# Patient Record
Sex: MALE | URBAN - METROPOLITAN AREA
[De-identification: names, ages, dates, MRNs, and addresses within clinical notes are randomized per-mention and may not be internally consistent; named-entity substitution may affect disease eponyms.]

---

## 2023-11-06 ENCOUNTER — OFFICE VISIT (OUTPATIENT)
Dept: URBAN - METROPOLITAN AREA CLINIC 86 | Facility: CLINIC | Age: 18
End: 2023-11-06
Payer: COMMERCIAL

## 2023-11-06 ENCOUNTER — LAB OUTSIDE AN ENCOUNTER (OUTPATIENT)
Dept: URBAN - METROPOLITAN AREA CLINIC 86 | Facility: CLINIC | Age: 18
End: 2023-11-06

## 2023-11-06 VITALS
SYSTOLIC BLOOD PRESSURE: 115 MMHG | DIASTOLIC BLOOD PRESSURE: 73 MMHG | BODY MASS INDEX: 23.34 KG/M2 | HEART RATE: 67 BPM | WEIGHT: 163 LBS | TEMPERATURE: 96.8 F | HEIGHT: 70 IN

## 2023-11-06 DIAGNOSIS — R74.8 ELEVATED LIVER ENZYMES: ICD-10-CM

## 2023-11-06 DIAGNOSIS — Z71.89 VACCINE COUNSELING: ICD-10-CM

## 2023-11-06 PROCEDURE — 99204 OFFICE O/P NEW MOD 45 MIN: CPT | Performed by: PHYSICIAN ASSISTANT

## 2023-11-06 NOTE — PHYSICAL EXAM HENT:
Head,  normocephalic,  atraumatic,  Face,  Face within normal limits,  Ears,  External ears within normal limits,  Nose/Nasopharynx, pt wearing mask

## 2023-11-06 NOTE — HPI-TODAY'S VISIT:
This is 19 yo male here for abnormal liver enzymes. Referred by  DR. Vargas. A copy of the note will be sent to the referring provider.   Abnormal liver enzymes were noted  on 10/27/23 and cr 0.93, na 140, k 4.6, tb 0.6, alp 70, ast 97, alt 187 cbc with wbc 4.3, rbc 5.0, hgb 14, mcv 90 platelets 171 ldl 110 HIV negative.  g/c/trich negative asked about vitamins and not taking pre work out called ford has nacin 20 mg, black pepper extract, coffe arabic, bitter organce extrace , cognizin, snthernergy tm.  He denies nsaids, recent illness, other supplements. denies seammoss.  Does have tumeric in diet but not new and will reduce or stop for time being.  did not do cpx labs last year so nothing to compe to  Imaging done none and will order.

## 2023-11-09 ENCOUNTER — TELEPHONE ENCOUNTER (OUTPATIENT)
Dept: URBAN - METROPOLITAN AREA CLINIC 86 | Facility: CLINIC | Age: 18
End: 2023-11-09

## 2023-11-11 LAB
A/G RATIO: 2.1
ACTIN (SMOOTH MUSCLE) ANTIBODY: 12
ALBUMIN: 5
ALKALINE PHOSPHATASE: 69
ALPHA-1-ANTITRYPSIN, SERUM: 133
ALT (SGPT): 191
ANA DIRECT: NEGATIVE
AST (SGOT): 92
BASO (ABSOLUTE): 0
BASOS: 1
BILIRUBIN, TOTAL: 0.7
BUN/CREATININE RATIO: 14
BUN: 12
CALCIUM: 9.9
CARBON DIOXIDE, TOTAL: 25
CERULOPLASMIN: 18.1
CHLORIDE: 102
CREATININE: 0.88
EGFR: 126
EOS (ABSOLUTE): 0.1
EOS: 1
FERRITIN, SERUM: 176
GLOBULIN, TOTAL: 2.4
GLUCOSE: 77
HBSAG SCREEN: NEGATIVE
HEMATOCRIT: 45.9
HEMATOLOGY COMMENTS:: (no result)
HEMOGLOBIN: 15.7
HEP A AB, TOTAL: POSITIVE
HEP B CORE AB, TOT: NEGATIVE
HEPATITIS B SURF AB QUANT: <3.1
IMMATURE CELLS: (no result)
IMMATURE GRANS (ABS): 0
IMMATURE GRANULOCYTES: 0
IMMUNOGLOBULIN A, QN, SERUM: 249
IMMUNOGLOBULIN E, TOTAL: 3
IMMUNOGLOBULIN G, QN, SERUM: 1142
IMMUNOGLOBULIN M, QN, SERUM: 104
IRON BIND.CAP.(TIBC): 356
IRON SATURATION: 51
IRON: 182
LYMPHS (ABSOLUTE): 2.5
LYMPHS: 49
MCH: 30.8
MCHC: 34.2
MCV: 90
MITOCHONDRIAL (M2) ANTIBODY: <20
MONOCYTES(ABSOLUTE): 0.4
MONOCYTES: 8
NEUTROPHILS (ABSOLUTE): 2.1
NEUTROPHILS: 41
NRBC: (no result)
PHENOTYPE (PI): (no result)
PLATELETS: 189
POTASSIUM: 4.5
PROTEIN, TOTAL: 7.4
RBC: 5.09
RDW: 13.1
SODIUM: 140
UIBC: 174
WBC: 5.2

## 2023-11-13 ENCOUNTER — LAB OUTSIDE AN ENCOUNTER (OUTPATIENT)
Dept: URBAN - METROPOLITAN AREA CLINIC 86 | Facility: CLINIC | Age: 18
End: 2023-11-13

## 2023-11-13 ENCOUNTER — TELEPHONE ENCOUNTER (OUTPATIENT)
Dept: URBAN - METROPOLITAN AREA CLINIC 86 | Facility: CLINIC | Age: 18
End: 2023-11-13

## 2023-11-14 LAB
ALBUMIN: 4.7
ALKALINE PHOSPHATASE: 70
ALT (SGPT): 127
AST (SGOT): 62
BILIRUBIN, DIRECT: 0.21
BILIRUBIN, TOTAL: 0.9
PROTEIN, TOTAL: 7.3

## 2023-12-15 ENCOUNTER — OFFICE VISIT (OUTPATIENT)
Dept: URBAN - METROPOLITAN AREA CLINIC 91 | Facility: CLINIC | Age: 18
End: 2023-12-15

## 2023-12-15 ENCOUNTER — TELEPHONE ENCOUNTER (OUTPATIENT)
Dept: URBAN - METROPOLITAN AREA MEDICAL CENTER 28 | Facility: MEDICAL CENTER | Age: 18
End: 2023-12-15

## 2023-12-29 ENCOUNTER — OFFICE VISIT (OUTPATIENT)
Dept: URBAN - METROPOLITAN AREA CLINIC 91 | Facility: CLINIC | Age: 18
End: 2023-12-29
Payer: COMMERCIAL

## 2023-12-29 DIAGNOSIS — R74.8 ELEVATED LIVER ENZYMES: ICD-10-CM

## 2023-12-29 PROCEDURE — 76705 ECHO EXAM OF ABDOMEN: CPT

## 2023-12-29 PROCEDURE — 93975 VASCULAR STUDY: CPT

## 2024-01-04 ENCOUNTER — TELEPHONE ENCOUNTER (OUTPATIENT)
Dept: URBAN - METROPOLITAN AREA CLINIC 86 | Facility: CLINIC | Age: 19
End: 2024-01-04

## 2024-01-04 NOTE — HPI-FREE TEXT
Dear Manas Acosta,  The 12/29/23 ultrasound was sent to me. The liver echogenicity appears normal. They did not see any lesions. The common duct is 4 mm and this is normal. Gallbladder normal. Pancreas normal were seen. The hepatic vasculature is patent. Spleen 9.9 cm and this is normal. The right kidney appears normal. Overall they thought this is a normal study which is good to note. If you recall, we were checking this given your elevated liver enzymes. At the office visit we discussed stopping the preworkout and following the labs. The labs were improving off the preworkout and I will have the office contact you to get some more labs done before the appointment.  Hanna Andrew PA-C

## 2024-01-09 ENCOUNTER — TELEPHONE ENCOUNTER (OUTPATIENT)
Dept: URBAN - METROPOLITAN AREA CLINIC 86 | Facility: CLINIC | Age: 19
End: 2024-01-09

## 2024-01-09 LAB
ALBUMIN: 4.6
ALKALINE PHOSPHATASE: 75
ALT (SGPT): 61
AST (SGOT): 41
BILIRUBIN, DIRECT: 0.16
BILIRUBIN, TOTAL: 0.7
PROTEIN, TOTAL: 6.8

## 2024-01-09 NOTE — HPI-TODAY'S VISIT:
Dear Manas Acosta,   The 1/8/24 labs were sent to me. The bilirubin total 0.7, alkaline phosphatase 75, ast 41, alt 61. Improving still as previously ast 62, alt 127. Happy to see this. Will review again at the follow up. Sima Andrew PA-C

## 2024-01-11 ENCOUNTER — DASHBOARD ENCOUNTERS (OUTPATIENT)
Age: 19
End: 2024-01-11

## 2024-01-11 ENCOUNTER — OFFICE VISIT (OUTPATIENT)
Dept: URBAN - METROPOLITAN AREA CLINIC 86 | Facility: CLINIC | Age: 19
End: 2024-01-11
Payer: COMMERCIAL

## 2024-01-11 VITALS
WEIGHT: 162 LBS | SYSTOLIC BLOOD PRESSURE: 96 MMHG | HEART RATE: 68 BPM | TEMPERATURE: 97.7 F | BODY MASS INDEX: 23.19 KG/M2 | DIASTOLIC BLOOD PRESSURE: 62 MMHG | HEIGHT: 70 IN

## 2024-01-11 DIAGNOSIS — R74.8 ELEVATED LIVER ENZYMES: ICD-10-CM

## 2024-01-11 DIAGNOSIS — Z71.89 VACCINE COUNSELING: ICD-10-CM

## 2024-01-11 PROCEDURE — 99214 OFFICE O/P EST MOD 30 MIN: CPT | Performed by: PHYSICIAN ASSISTANT

## 2024-01-11 NOTE — HPI-TODAY'S VISIT:
This is 19 yo male here for abnormal liver enzymes. Referred by  DR. Vargas. A copy of the note will be sent to the referring provider.    1/11/24 office visit   1/8/24 labs were sent to me. The bilirubin total 0.7, alkaline phosphatase 75, ast 41, alt 61. Improving still as previously ast 62, alt 127. Happy to see this. Will review again at the follow up.   The 12/29/23 ultrasound was sent to me. The liver echogenicity appears normal. They did not see any lesions. The common duct is 4 mm and this is normal. Gallbladder normal. Pancreas normal were seen. The hepatic vasculature is patent. Spleen 9.9 cm and this is normal. The right kidney appears normal. Overall they thought this is a normal study which is good to note. If you recall, we were checking this given your elevated liver enzymes. At the office visit we discussed stopping the preworkout and following the labs. The labs were improving off the preworkout and I will have the office contact you to get some more labs done before the appointment.  Hanna Andrew PA-C  The 11/6/23 labs were sent to me. The immunoglobulin testing was normal other than that the IgA was 3 and this is slightly but I would just recommend sharing that with her primary care. Does not appear there is an immune issue going on with the liver. Iron binding capacity 356 and this is normal, iron slightly elevated at 182 however limits of normal 169. 51% saturation this is normal. Ceruloplasmin normal. Ferritin 176 and this is normal. You are not immune to hepatitis B and I recommend discussing that vaccine with your primary care provider. ASMA negative, AMA negative. Alpha 1 antitrypsin level 133 and the phenotype is MM and this is normal. The creatinine 0.88, sodium 140, potassium 4.5, bilirubin 0.7, alkaline phosphatase 69, AST 92, . FREDDIE negative as well. Given that the ASMA, AMA, and FREDDIE negative I do not feel like there is an autoimmune issue at play here. The hemoglobin 15.7, MCV 90, platelets 189. There is no evidence of hepatitis B. You have immunity to hepatitis A. As discussed really need to work on the diet and reduce any excess turmeric as well. These labs do not indicate that there is any autoimmune viral or genetic issues at play here. We will see what your ultrasound shows us as well as the labs ordered for 11/13/23.   ,  Disucssed likely the preoworkout and tumeric combo could have set this off need to follow until normal.   recap Abnormal liver enzymes were noted  on 10/27/23 and cr 0.93, na 140, k 4.6, tb 0.6, alp 70, ast 97, alt 187 cbc with wbc 4.3, rbc 5.0, hgb 14, mcv 90 platelets 171 ldl 110 HIV negative.  g/c/trich negative asked about vitamins and not taking pre work out called ford has nacin 20 mg, black pepper extract, coffe arabic, bitter organce extrace , cognizin, snthernergy tm.  He denies nsaids, recent illness, other supplements. denies seammoss.  Does have tumeric in diet but not new and will reduce or stop for time being.  did not do cpx labs last year so nothing to compe to  Imaging done none and will order. This is 19 yo male here for abnormal liver enzymes. Referred by  DR. Vargas. A copy of the note will be sent to the referring provider.   Abnormal liver enzymes were noted  on 10/27/23 and cr 0.93, na 140, k 4.6, tb 0.6, alp 70, ast 97, alt 187 cbc with wbc 4.3, rbc 5.0, hgb 14, mcv 90 platelets 171 ldl 110 HIV negative.  g/c/trich negative asked about vitamins and not taking pre work out called ford has nacin 20 mg, black pepper extract, coffe arabic, bitter organce extrace , cognizin, snthernergy tm.  He denies nsaids, recent illness, other supplements. denies seammoss.  Does have tumeric in diet but not new and will reduce or stop for time being.  did not do cpx labs last year so nothing to compe to  Imaging done none and will order.

## 2024-02-08 ENCOUNTER — LAB (OUTPATIENT)
Dept: URBAN - METROPOLITAN AREA CLINIC 86 | Facility: CLINIC | Age: 19
End: 2024-02-08

## 2024-02-09 LAB
ALBUMIN: 4.8
ALKALINE PHOSPHATASE: 69
ALT (SGPT): 43
AST (SGOT): 36
BILIRUBIN, DIRECT: 0.17
BILIRUBIN, TOTAL: 0.8
PROTEIN, TOTAL: 6.9

## 2024-04-01 ENCOUNTER — LAB (OUTPATIENT)
Dept: URBAN - METROPOLITAN AREA CLINIC 86 | Facility: CLINIC | Age: 19
End: 2024-04-01

## 2024-04-11 ENCOUNTER — OV EP (OUTPATIENT)
Dept: URBAN - METROPOLITAN AREA CLINIC 86 | Facility: CLINIC | Age: 19
End: 2024-04-11